# Patient Record
(demographics unavailable — no encounter records)

---

## 2017-01-16 NOTE — PDOC
History of Present Illness





- General


Chief Complaint: Sore Throat


Stated Complaint: SORE THROAT, COUGH


Time Seen by Provider: 01/16/17 13:33


History Source: Patient


Exam Limitations: No Limitations





- History of Present Illness


Initial Comments: 


CHIEF COMPLAINT:  25 y/o afebrile female with no significant PMH c/o cold 

symptoms since last night. 





HISTORY OF PRESENT ILLNESS:  The patient states she developed a fever of 100.8, 

dry cough and sore throat last night.  She took tylenol and went to bed.  This 

morning the symptoms persisted so she took tylenol and came here.  She denies HA

, neck pain, body aches, runny nose, n/v/d, CP, SOB, abd pain.  





Vital signs on arrival are within normal limits. 





REVIEW OF SYSTEMS:


GENERAL/CONSTITUTIONAL: + fever/chills. No weakness. No weight change.


HEAD, EYES, EARS, NOSE AND THROAT: No change in vision. No ear pain or 

discharge. +sore throat. 


CARDIOVASCULAR: No chest pain or shortness of breath.


RESPIRATORY: +dry cough.  No wheezing, or hemoptysis.


GASTROINTESTINAL: No abd pain, nausea, vomiting, diarrhea. 


GENITOURINARY: No dysuria, frequency, or change in urination.


MUSCULOSKELETAL: No joint or muscle swelling or pain. No neck or back pain.


SKIN: No rash or easy bruising.


NEUROLOGIC: No headache, vertigo, loss of consciousness, or loss of sensation.





PHYSICAL EXAM:


GENERAL: The patient is awake, alert, and fully oriented, in no acute distress. 

She is well appearing, in NAD or obvious discomfort. 


HEAD: Normal with no signs of trauma.


ENT: Pupils equal, round and reactive to light, extraocular movements intact, 

sclera anicteric, conjunctiva clear. Neck supple.  TMs normal b/l. 


LUNGS: Clear to auscultation bilaterally. Normal excursion. No respiratory 

distress or use of accessory muscles.


CV: RRR, S1/S2, no MRG. Cap refill < 2 sec.


ABDOMEN: Soft, non-distended, non-tender even to deep palpation, no 

hepatomegaly or splenomegaly, no masses.


EXTREMITIES: Normal range of motion, no edema.


NEUROLOGICAL: Normal speech, normal gait. CN II-XII grossly intact.


PSYCH: Normal mood, normal affect.


SKIN: Warm, dry, normal turgor, no rashes or lesions noted.

















Past History





- Past Medical History


Allergies/Adverse Reactions: 


 Allergies











Allergy/AdvReac Type Severity Reaction Status Date / Time


 


No Known Allergies Allergy   Verified 01/16/17 13:12











Home Medications: 


Ambulatory Orders





NK [No Known Home Medication]  08/30/16 








Other medical history: NONE





- Psycho/Social/Smoking Cessation Hx


Anxiety: No


Suicidal Ideation: No


Smoking History: Never smoked


Have you smoked in the past 12 months: No


Information on smoking cessation initiated: No


Hx Alcohol Use: No


Drug/Substance Use Hx: No


Substance Use Type: None





*Physical Exam





- Vital Signs


 Last Vital Signs











Temp Pulse Resp BP Pulse Ox


 


 97.4 F L  68   18   131/75   100 


 


 01/16/17 13:13  01/16/17 13:13  01/16/17 13:13  01/16/17 13:13  01/16/17 13:13














Medical Decision Making





- Medical Decision Making


A/P:  25 y/o afebrile female with viral URI.  Instructed the patient to 

continue taking 650mg of tylenol every 4 hours for fever, take over the counter 

cough medicine, gargle with warm salt water, drink plenty of fluids and get 

lots of rest and f/u with her doctor by the end of the week.  Suggested she 

return to the ER with any worsening or concerning symptoms. 





The patient verbalizes understanding of all instructions, has no further 

questions and is awaiting discharge.











*DC/Admit/Observation/Transfer


Diagnosis at time of Disposition: 


 Viral URI with cough





- Discharge Dispostion


Disposition: HOME


Condition at time of disposition: Good





- Referrals


Referrals: 


Eileen Goddard MD [Primary Care Provider] - 





- Patient Instructions


Printed Discharge Instructions:  DI for Viral Upper Respiratory Infection -- 

Adult


Additional Instructions: 


Discharge Instructions:


-Take 650mg of Tylenol every 4 hours for fever


-Gargle with warm salt water


-Take over the counter cough medicine


-Drink plenty of fluids and get lots of rest


-Follow up with your doctor by the end of the week


Print Language: Slovak

## 2017-03-22 NOTE — PDOC
History of Present Illness





- General


Chief Complaint: Sore Throat


Stated Complaint: FEVER, SORE THROAT, HEADACHE


Time Seen by Provider: 03/22/17 09:08


History Source: Patient


Exam Limitations: No Limitations





- History of Present Illness


Initial Comments: 





03/22/17 09:45


26 yr male with c/o sore throat and cough for one day. Pt was sneezing this am. 

Father states he has allergies and asthma. no fever, no abd pain or vomiting. 





Past History





- Past Medical History


Allergies/Adverse Reactions: 


 Allergies











Allergy/AdvReac Type Severity Reaction Status Date / Time


 


No Known Allergies Allergy   Verified 03/22/17 08:49











Home Medications: 


Ambulatory Orders





Penicillin V Potassium [Pen Vee K -] 500 mg PO TID #30 tablet 03/22/17 








Other medical history: NONE





- Psycho/Social/Smoking Cessation Hx


Anxiety: No


Suicidal Ideation: No


Smoking History: Never smoked


Have you smoked in the past 12 months: No


Hx Alcohol Use: No


Drug/Substance Use Hx: No


Substance Use Type: None





*Physical Exam





- Vital Signs


 Last Vital Signs











Temp Pulse Resp BP Pulse Ox


 


 99.0 F   82   20   134/73   98 


 


 03/22/17 08:46  03/22/17 08:46  03/22/17 08:46  03/22/17 08:46  03/22/17 08:46














- Physical Exam


General Appearance: Yes: Nourished, Appropriately Dressed


HEENT: positive: EOMI, ELISE, Pharyngeal Erythema, Tonsillar Erythema, Nasal 

Congestion


Neck: positive: Supple


Respiratory/Chest: positive: Lungs Clear, Normal Breath Sounds


Cardiovascular: positive: Regular Rhythm, Regular Rate


Gastrointestinal/Abdominal: positive: Normal Bowel Sounds, Soft


Extremity: positive: Normal Capillary Refill, Normal Inspection, Normal Range 

of Motion


Integumentary: positive: Normal Color, Dry, Warm


Neurologic: positive: Fully Oriented, Alert, Normal Mood/Affect, Normal Response

, Motor Strength 5/5





Medical Decision Making





- Medical Decision Making





03/22/17 10:05


cc: sore throat, nasal congestion


will check for strep and flu


pt speaking full sentences , no drooling or trismus 








*DC/Admit/Observation/Transfer


Diagnosis at time of Disposition: 


 Strep pharyngitis





- Discharge Dispostion


Disposition: HOME


Condition at time of disposition: Good





- Prescriptions


Prescriptions: 


Penicillin V Potassium [Pen Vee K -] 500 mg PO TID #30 tablet





- Patient Instructions


Additional Instructions: 


take the penicillin as prescribed for 10 days, finish all the pills


gargle with warm salt water 4-5 times a day 


ice pops, jello, ice cream soft foods 





take motrin or tylenol (over the counter) for pain as needed 





throw out toothbrush at end of treatment


follow with your doctor if getting worse


do not share utensils or drink from same source

## 2019-08-16 NOTE — PDOC
Rapid Medical Evaluation


Chief Complaint: Headache


Time Seen by Provider: 08/16/19 17:18


Medical Evaluation: 


 Allergies











Allergy/AdvReac Type Severity Reaction Status Date / Time


 


No Known Allergies Allergy   Verified 03/22/17 08:49











08/16/19 17:19


I have performed a brief in-person evaluation of this patient. 


The patient presents with a chief complaint of: headache with dizziness / Nausea

, saw  Neurologist yesterday but not better with new treatments 


Pertinent physical exam findings: A&Ox3


I have ordered the following: UA, UCG


The patient will proceed to the ED for further evaluation.





**Discharge Disposition





- Diagnosis


 Headache








- Referrals





- Patient Instructions





- Post Discharge Activity

## 2019-08-16 NOTE — PDOC
History of Present Illness





- General


Chief Complaint: Headache


Stated Complaint: HEADACHE DIZZY


Time Seen by Provider: 08/16/19 17:18


History Source: Patient





- History of Present Illness


Initial Comments: 





08/16/19 19:33


28 year old female with history of migraines c/o headache, dizziness and nausea 

x1 day. patient reports that this is similar presentation of her previous 

migraines. patient is currently on every day medication to prevent migraines. 

denies weakness, abdominal pain, chest pain, urinary symptoms, fever/ vchill, 

neck pain / stiffness. denies photophobia/ phonophobia





PMHX: Migraines 


08/16/19 19:47





08/16/19 19:48








Past History





- Past Medical History


Allergies/Adverse Reactions: 


 Allergies











Allergy/AdvReac Type Severity Reaction Status Date / Time


 


No Known Allergies Allergy   Verified 08/16/19 17:19











Home Medications: 


Ambulatory Orders





NK [No Known Home Medication]  08/16/19 








COPD: No


CHF: No





- Immunization History


Immunization Up to Date: Yes





- Suicide/Smoking/Psychosocial Hx


Smoking History: Never smoked


Have you smoked in the past 12 months: No


Information on smoking cessation initiated: No


Hx Alcohol Use: No


Drug/Substance Use Hx: No


Substance Use Type: None





**Review of Systems





- Review of Systems


Able to Perform ROS?: Yes


Is the patient limited English proficient: No


Constitutional: No: Symptoms Reported, See HPI, Chills, Diaphoresis, Fever, 

Loss of Appetite, Malaise, Night Sweats, Weakness, Weight Stable, Unintentional 

Wgt. Loss, Unexplained wgt Loss, Other


HEENTM: No: Symptoms Reported, See HPI, Eye Pain, Blurred Vision, Tearing, 

Recent change in vision, Double Vision, Cataracts, Ear Pain, Ocular Prothesis, 

Ear Discharge, Nose Pain, Nose Congestion, Tinnitus, Nose Bleeding, Hearing Loss

, Throat Pain, Throat Swelling, Mouth Pain, Dental Problems, Difficulty 

Swallowing, Mouth Swelling, Other


ABD/GI: Yes: Nausea.  No: Symptoms Reported, See HPI, Abdominal Distended, Abd. 

Pain w/ defecation, Blood Streaked Bowels, Constipated, Diarrhea, Difficulty 

Swallowing, Poor Appetite, Poor Fluid Intake, Rectal Bleeding, Vomiting, 

Indigestion, Abdominal cramping, Tarry Stools, Other


Neurological: Yes: Headache, Dizziness.  No: Symptoms reported, See HPI, 

Numbness, Paresthesia, Pre-Existing Deficit, Seizure, Tingling, Tremors, 

Weakness, Unsteady Gait, Ataxia, Other





*Physical Exam





- Vital Signs


 Last Vital Signs











Temp Pulse Resp BP Pulse Ox


 


 99.3 F   76   17   151/75   100 


 


 08/16/19 17:19  08/16/19 17:19  08/16/19 17:19  08/16/19 17:19  08/16/19 17:19














- Physical Exam


General Appearance: Yes: Appropriately Dressed


HEENT: positive: Normal ENT Inspection


Respiratory/Chest: positive: Lungs Clear, Normal Breath Sounds


Cardiovascular: positive: Regular Rhythm, Regular Rate


Gastrointestinal/Abdominal: positive: Normal Bowel Sounds, Soft.  negative: 

Tender


Extremity: positive: Normal Capillary Refill, Normal Inspection, Normal Range 

of Motion


Integumentary: positive: Normal Color, Dry, Warm


Neurologic: positive: CNs II-XII NML intact, Fully Oriented, Alert, Normal Mood/

Affect, Normal Response, Motor Strength 5/5





ED Treatment Course





- LABORATORY


CBC & Chemistry Diagram: 


 08/16/19 19:05





 08/16/19 19:05





Progress Note





- Progress Note


Progress Note: 





A: migraines








P: cbc





cmp








ua








IVF





reglan





benadryl





Medical Decision Making





- Medical Decision Making





08/16/19 22:01


patient is now feeling better





US: hepatic steaosis. 








will d/c home





*DC/Admit/Observation/Transfer


Diagnosis at time of Disposition: 


 Elevated LFTs





Headache


Qualifiers:


 Headache type: unspecified Headache chronicity pattern: acute headache 

Intractability: not intractable Qualified Code(s): R51 - Headache








- Discharge Dispostion


Disposition: HOME





- Referrals


Referrals: 


Mariaelena Erazo DO [Staff Physician] - 





- Patient Instructions


Printed Discharge Instructions:  DI for Migraine


Additional Instructions: 








drink plenty of fluids.





follow-up with your neurologist as soon as possible.





Continue your migraine medications.





You are also given a referral for a gastroenterologist. Follow-up with the 

primary doctor or a gastroenterologist for elevated





 liver test.








Return to the emergency room for any worsening symptoms.





- Post Discharge Activity


Forms/Work/School Notes:  Back to Work

## 2020-07-01 NOTE — OP
Operative Note





- Note:


Operative Date: 20


Pre-Operative Diagnosis: 2 prior C/S, desires Repeat 


Operation: Repeat 


Findings: 





VFI, Direct OP, Double nuchal, true knot, Apgars 9/9. Weight 7lbs 4oz. Left tube

and ovary palpably normal, Right tube and ovary not visualized secondary to 

adhesions





Implants: Surgicele


Post-Operative Diagnosis: Same as Pre-op


Surgeon: Essie Gore


Assistant: Arthur Burr


Anesthesiologist/CRNA: Unruly Alcantara


Anesthesia: Spinal


Estimated Blood Loss (mls): 800


Drains, Volume Out (mls): 200 (clear urine)


Operative Report Dictated: Yes

## 2020-07-02 NOTE — PN
Progress Note (short form)





- Note


Progress Note: 





s/p C/s with spinal and intrathecal Duramorph.  Doing well, no complaints, 

ambulating.  All questions answered.

## 2020-07-02 NOTE — OP
DATE OF OPERATION:  2020

 

PREOPERATIVE DIAGNOSES:  A 39-week pregnancy, 2 prior  sections, desires

repeat  section.

 

POSTOPERATIVE DIAGNOSES:  A 39-week pregnancy, 2 prior  sections, 
desires

repeat  section.

 

PROCEDURE:  Repeat low transverse  section.

 

ANESTHESIA:  Spinal.

 

ANESTHESIOLOGIST:  Unruly Alcantara MD

 

SURGEON:  Essie Gore MD

 

ASSISTANT:  VIRA Sykes

 

ESTIMATED BLOOD LOSS:  800.

 

INTRAVENOUS FLUIDS:  Per anesthesia record.

 

URINE OUTPUT:  Clear urine, 200 mL, at the end of the procedure.

 

FINDINGS:  A viable female infant, direct OP presentation, double nuchal, true 
knot,

Apgars 9, 9, weight pending.  Left tube and ovary palpably normal.  Right tube 
and

ovary not visualized secondary to adhesions.

 

COMPLICATIONS:  None.

 

CONDITION:  Stable to recovery room.

 

NATURE OF THE PROCEDURE:  After the appropriate consents were signed patient was

taken to the operating room.  Spinal anesthesia was administered.  The abdomen 
was

prepped and draped in the normal sterile fashion.  A sterile Mendoza catheter was

inserted prior to entry into the operating room.  Timeout was performed, 
confirming

correct patient and procedure.  

 

A Pfannenstiel incision was made through the previous incision, carried through 
the

underlying layers until the fascia was nicked in the midline.  Fascia was noted 
to be

dense and the fascia was extended laterally with the scalpel and with the Bovie.
 The

inferior aspect of the fascia was grasped with the Kocher clamps, tented upwards
and

the rectus muscles were dissected off with the Daniels scissors.  Attention was 
then

paid to the superior aspect which had to be taken down with the scalpel again

secondary to dense adhesions.  The rectus muscles were  sharply in the

midline with the scalpel.  There were several thin filmy dense adhesions from 
the

anterior uterine wall to the anterior abdominal wall.  The thin adhesions were 
taken

down with the Bovie and Metzenbaum scissors.  Dense adhesions, however, could 
not be

taken down.  There  were dense adhesions  to the uterine fundus that prevented

mobilization and the ability to exteriorize the uterus postprocedure.  There 
were

adhesions on the lateral aspect of the uterus which also prevented visualization
of

the adnexa completely.  The uterus was incised in a low transverse fashion.  
Clear

amniotic fluid was noted.  The infant was noted to be in direct OP presentation.
 The

head was delivered without difficulty as were the remaining shoulder and body.  
The

cord was clamped and cut.  The infant was handed off to the waiting NICU staff.

 

The placenta was then removed manually.  The uterus was cleared of all clot and

debris.  Again, the uterus could not be exteriorized secondary to adhesions.  
The

hysterotomy was closed in 2 layers with a 1-0 and 0 Vicryl to achieve 
hemostasis. 

Intercede was then placed over the patient's hysterotomy as well as areas where 
the

filmy adhesions had been taken down.  This was placed to prevent further 
adhesion

formation.  The fascia was then closed with a 0 Vicryl.  The subcutaneous fat 
was

closed with a 3-0 plain.  The skin was closed with staples.  All sponge, lap, 
needle

counts were correct x3.  The patient did receive Ancef at the start of the 
procedure.

 Patient did consent for tubal ligation; however, it was done under 30 days and 
could

not be performed at the time of the operation because of this.  However, patient
was

counseled that secondary to adhesions tubal ligation could not be performed

regardless and that she will have to pursue LARCs or an interval salpingectomy 
in the

future.  She was taken from the operating room to the recovery area in stable

condition.

 

 

MD BARBRA FITZPATRICK/[id number]

DD: 2020 15:47

DT: 2020 11:21

Job #:  [id number]

[id number]

## 2020-07-02 NOTE — PN
Post Partum Progress Note





- Subjective


Subjective: 





Pain controlled. No fevers/chills. No N/V.


Post Partum Day: 1


Type of Delivery: Repeat C/S


Vital Signs: 


                                   Vital Signs











Temperature  98.8 F   20 06:00


 


Pulse Rate  74   20 06:00


 


Respiratory Rate  18   20 06:00


 


Blood Pressure  102/65   20 06:00


 


O2 Sat by Pulse Oximetry (%)  100   20 16:10











Uterus: Yes: Fundus Firm


Incision: Yes: Dressing dry and intact, Dema intact


Abdomen/GI: Yes: Abdomen soft, Tolerating PO


Lochia: Yes: Rubra


Lochia, amount: Small


Extremities: Yes: Calves non-tender


Perineum: Yes: Intact


Activity: Ambulating





Problem List





- Problems


(1) 39 weeks gestation of pregnancy


Code(s): Z3A.39 - 39 WEEKS GESTATION OF PREGNANCY   





Assessment/Plan





30yo  s/p RLTCS, POD#1


Routine PP care


PO pain control


Labs pending


OOB, ambulate


D/C to [facility]


Anticipate D/C by POD#3





JABIER Gore MD

## 2020-07-03 NOTE — DS
Physical Exam-GYN


Vital Signs: 


                                   Vital Signs











Temperature  98.7 F   20 22:00


 


Pulse Rate  86   20 22:00


 


Respiratory Rate  18   20 22:00


 


Blood Pressure  134/84   20 22:00


 


O2 Sat by Pulse Oximetry (%)  100   20 16:10











Constitutional: Yes: Well Nourished, No Distress, Calm


Eyes: Yes: WNL, Conjunctiva Clear, EOM Intact


HENT: Yes: WNL, Atraumatic, Normocephalic


Neck: Yes: WNL, Supple, Trachea Midline


Cardiovascular: Yes: WNL, Regular Rate and Rhythm


Respiratory: Yes: WNL, Regular, CTA Bilaterally


Gastrointestinal: Yes: WNL


...Rectal Exam: Yes: WNL


Renal/: Yes: WNL


....Post Partum: Yes: Uterus firm, Uterus non-tender, Slight lochia rubra


Breast(s): Yes: WNL


Musculoskeletal: Yes: WNL


Extremities: Yes: WNL


Integumentary: Yes: WNL


Wound/Incision: Yes: Clean/Dry, Well Approximated, Sutures Intact


Neurological: Yes: WNL, Alert, Oriented


...Motor Strength: WNL


Psychiatric: Yes: WNL, Alert, Oriented


Labs: 


                                    CBC, BMP





                                 20 07:19 











Delivery





- Delivery


 Section: Repeat, Low Flap Transverse


Type of Anesthesia: Spinal


Episiotomy/Laceration: None


EBL (cc): 800





Delivery, Single Birth





- Stages of Labor


Date of Delivery: 20


Time of Delivery: 14:40


Time Placenta Delivered: 14:41


Placenta: Yes: Expressed





- Condition of Infant


Pediatrician/Neonatologist Present: Yes


Name: Abimbola Hawthorne


Infant Gender: Female


Birth Weight: 7 lb 4 oz


Position: OP


Total Hours ROM (Hrs/Mins): 2





- Apgar


  ** 1 Minute


Apgar Total Score: 9





  ** 5 Minutes


Apgar Total Score: 9





-  Feeding Plan


Initial Plan: Elected not to breastfeed exclusively throughout hospitalization





Discharge Summary


Problems reviewed: Yes


Reason For Visit: 


Current Active Problems





39 weeks gestation of pregnancy (Acute)








Procedures: Principal: repeat c/s


Hospital Course: 


no complication


Plan of Treatment: 


follow up HR 1 week


Condition: Stable





- Instructions


Diet, Activity, Other Instructions: 


Regular Diet


Follow up in one week to have your staples removed


Referrals: 


Essie Gore MD [Staff Physician] - 


Disposition: HOME





- Home Medications


Comprehensive Discharge Medication List: 


Ambulatory Orders





Breast Pump 1 each MC 5XD 30 Days #1 each 20 


Ferrous Sulfate [Feosol] 325 mg PO DAILY #30 tablet 20 


Ibuprofen 600 mg PO Q6H PRN #30 tablet 20 


Oxycodone HCl/Acetaminophen [Percocet 5-325 mg Tablet -] 1 - 2 tab PO Q6H PRN 

#20 tab MDD 4 20

## 2020-07-06 NOTE — PATH
Surgical Pathology Report



Patient Name:  JAMES VALENZUELA

Accession #:  [id number]

Med. Rec. #:  [id number]                                                        

   /Age/Gender:  1990 (Age: 29) / F

Account:  [id number]                                                          

             Location: [facility] OBS/GYN

Taken:  2020

Received:  2020

Reported:  2020

Physicians:  Essie Gore

  



Specimen(s) Received

 PLACENTA 





Clinical History

 x2







Final Diagnosis

PLACENTA:

THIRD TRIMESTER PLACENTA WITH FOCAL CHRONIC DECIDUITIS.

TRIVASCULAR CORD WITH A TRUE KNOT.

MEMBRANES WITH NO DIAGNOSTIC ABNORMALITIES.





***Electronically Signed***

Yunior Harden M.D.





Gross Description

The specimen is received fresh labeled placenta and is a 394 gram, 19.5 x 18.0 x

2.2 cm. placenta with attached membranes and umbilical cord.  The attached

membranes are tan, translucent with focal opacities and insert marginally.  The

umbilical cord measures 51 cm. in length and averages 1.2 cm. in diameter.  The

cord inserts eccentrically, 1 cm. to the nearest margin. There is a true knot

present in the umbilical cord. Cut surface of the umbilical cord reveals 3

vessels. The fetal surface is grey-blue with minimal fibrin deposition and

appropriate caliber vessels.  The maternal surface is red-brown with focal

defects.  Sectioning reveals red-brown, spongy parenchyma.  No lesions are

identified.  Representative sections are submitted in three cassettes as

follows: 1- membrane rolls and umbilical cord; 2-3- full thickness sections of

placenta.

/2020



saudi/[email or web address]